# Patient Record
Sex: FEMALE | Race: WHITE | URBAN - METROPOLITAN AREA
[De-identification: names, ages, dates, MRNs, and addresses within clinical notes are randomized per-mention and may not be internally consistent; named-entity substitution may affect disease eponyms.]

---

## 2021-08-18 ENCOUNTER — LAB OUTSIDE AN ENCOUNTER (OUTPATIENT)
Dept: URBAN - METROPOLITAN AREA CLINIC 98 | Facility: CLINIC | Age: 71
End: 2021-08-18

## 2021-08-18 ENCOUNTER — WEB ENCOUNTER (OUTPATIENT)
Dept: URBAN - METROPOLITAN AREA CLINIC 98 | Facility: CLINIC | Age: 71
End: 2021-08-18

## 2021-08-18 ENCOUNTER — OFFICE VISIT (OUTPATIENT)
Dept: URBAN - METROPOLITAN AREA CLINIC 98 | Facility: CLINIC | Age: 71
End: 2021-08-18
Payer: MEDICARE

## 2021-08-18 VITALS
DIASTOLIC BLOOD PRESSURE: 85 MMHG | HEIGHT: 66 IN | WEIGHT: 169 LBS | TEMPERATURE: 97.3 F | SYSTOLIC BLOOD PRESSURE: 143 MMHG | HEART RATE: 60 BPM | BODY MASS INDEX: 27.16 KG/M2

## 2021-08-18 DIAGNOSIS — K63.5 COLON POLYP: ICD-10-CM

## 2021-08-18 DIAGNOSIS — Z91.89 COLON CANCER HIGH RISK: ICD-10-CM

## 2021-08-18 DIAGNOSIS — K21.9 GERD (GASTROESOPHAGEAL REFLUX DISEASE): ICD-10-CM

## 2021-08-18 PROCEDURE — 99214 OFFICE O/P EST MOD 30 MIN: CPT | Performed by: INTERNAL MEDICINE

## 2021-08-18 RX ORDER — SODIUM SULFATE, MAGNESIUM SULFATE, AND POTASSIUM CHLORIDE 17.75; 2.7; 2.25 G/1; G/1; G/1
12 TABLETS TABLET ORAL
Qty: 24 TABLET | Refills: 0 | OUTPATIENT
Start: 2021-08-18 | End: 2021-08-19

## 2021-08-18 NOTE — HPI-TODAY'S VISIT:
Mrs Cervantes is here for f/u: of diarrhea, gerd, and poyps on colonoscopy  Previously on 10/21/2019, she reports that has diarrhea for past 7-8 weeks.  She also notices that she has gassy and gurgling sensation in her mid-abdomen. She has up to 4 BM per day, completely liquid, but now has improved to 1 per day.  This has happened previously, and then resolved.  No recent antibiotics.  No Nsaid use.  The pain in her mid-abdomen is dull and full, burning in sensation.  Not tried any H2 or PPI.  Has been present for about 1 week.  Worse if hungry.   Does not drink much water.  Previously on 1/15/2020 that pt reports since being found to have esophagitis and gastric ulcers.  Since taking omeprazole for 2 months.  Her diarrhea has resolved since taking the PPI. . Today on 8/18/2021, pt reports that she has developed lactose intolerance, but has developed diarrhea since then.  Has started to take lactaid pills, which help somewhat.  No NSAID.  No longer taking PPI. . Has had cologuard in the past.  OSH labs see scan: stool culture neg, CBC nl, cmp nl, celiac panel neg PMH: low vit D SH: Etoh, no smoke FH: No colon cancer   10/31/2019: - LA Grade A reflux esophagitis. - Erythematous duodenopathy.  Biopsied. - Non-bleeding gastric ulcers with no stigmata of bleeding.  Biopsied. - Multiple gastric polyps.  Biopsied.  - One 15 mm polyp in the cecum, removed with a hot snare.  Resected and retrieved. - One 6 mm polyp in the rectum, removed with a hot snare and removed with a cold snare.  Resected and retrieved. - The examination was otherwise normal on direct and retroflexion views.  A. Duodenum , Biopsy: -Duodenal mucosa with no diagnostic abnormality. -Normal villous architecture; no evidence of Celiac sprue. -Alcian blue/PAS stain to identify gastric foveolar metaplasia and Whipple's disease is negative; no parasites are seen. B. Gastric , Biopsy: -Mild chemical/reactive gastropathy. -Alcian blue/PAS stain is negative for intestinal metaplasia. -Giemsa stain is negative for H. pylori organisms, confirmed with immunohistochemical stain. C. Cecum Polyp, Polypectomy: -Hyperplastic polyp. D. Rectum Polyp, Polypectomy: -Tubular adenoma. .

## 2021-09-21 ENCOUNTER — CLAIMS CREATED FROM THE CLAIM WINDOW (OUTPATIENT)
Dept: URBAN - METROPOLITAN AREA CLINIC 4 | Facility: CLINIC | Age: 71
End: 2021-09-21
Payer: MEDICARE

## 2021-09-21 ENCOUNTER — OFFICE VISIT (OUTPATIENT)
Dept: URBAN - METROPOLITAN AREA SURGERY CENTER 18 | Facility: SURGERY CENTER | Age: 71
End: 2021-09-21
Payer: MEDICARE

## 2021-09-21 DIAGNOSIS — K31.89 ACQUIRED DEFORMITY OF DUODENUM: ICD-10-CM

## 2021-09-21 DIAGNOSIS — K25.9 ANTRAL ULCER: ICD-10-CM

## 2021-09-21 DIAGNOSIS — Z86.010 ADENOMAS PERSONAL HISTORY OF COLONIC POLYPS: ICD-10-CM

## 2021-09-21 DIAGNOSIS — K25.7 CHRONIC GASTRIC ULCER WITHOUT HEMORRHAGE OR PERFORATION: ICD-10-CM

## 2021-09-21 DIAGNOSIS — D12.3 ADENOMA OF TRANSVERSE COLON: ICD-10-CM

## 2021-09-21 DIAGNOSIS — K31.89 NONSURGICAL DUMPING SYNDROME: ICD-10-CM

## 2021-09-21 DIAGNOSIS — K63.89 POLYP, HYPERPLASTIC: ICD-10-CM

## 2021-09-21 PROCEDURE — 45385 COLONOSCOPY W/LESION REMOVAL: CPT | Performed by: INTERNAL MEDICINE

## 2021-09-21 PROCEDURE — G8907 PT DOC NO EVENTS ON DISCHARG: HCPCS | Performed by: INTERNAL MEDICINE

## 2021-09-21 PROCEDURE — 45380 COLONOSCOPY AND BIOPSY: CPT | Performed by: INTERNAL MEDICINE

## 2021-09-21 PROCEDURE — 43239 EGD BIOPSY SINGLE/MULTIPLE: CPT | Performed by: INTERNAL MEDICINE

## 2021-09-21 PROCEDURE — 88312 SPECIAL STAINS GROUP 1: CPT | Performed by: PATHOLOGY

## 2021-09-21 PROCEDURE — 88305 TISSUE EXAM BY PATHOLOGIST: CPT | Performed by: PATHOLOGY

## 2021-09-21 RX ORDER — PANTOPRAZOLE SODIUM 40 MG/1
1 TABLET TABLET, DELAYED RELEASE ORAL BID
Qty: 60 TABLET | Refills: 11 | OUTPATIENT
Start: 2021-09-21

## 2021-09-27 ENCOUNTER — TELEPHONE ENCOUNTER (OUTPATIENT)
Dept: URBAN - METROPOLITAN AREA CLINIC 92 | Facility: CLINIC | Age: 71
End: 2021-09-27

## 2022-07-12 ENCOUNTER — OFFICE VISIT (OUTPATIENT)
Dept: URBAN - METROPOLITAN AREA CLINIC 96 | Facility: CLINIC | Age: 72
End: 2022-07-12
Payer: MEDICARE

## 2022-07-12 ENCOUNTER — WEB ENCOUNTER (OUTPATIENT)
Dept: URBAN - METROPOLITAN AREA CLINIC 96 | Facility: CLINIC | Age: 72
End: 2022-07-12

## 2022-07-12 VITALS
DIASTOLIC BLOOD PRESSURE: 92 MMHG | WEIGHT: 173 LBS | TEMPERATURE: 98.7 F | HEIGHT: 66 IN | BODY MASS INDEX: 27.8 KG/M2 | SYSTOLIC BLOOD PRESSURE: 144 MMHG

## 2022-07-12 DIAGNOSIS — K21.9 GERD (GASTROESOPHAGEAL REFLUX DISEASE): ICD-10-CM

## 2022-07-12 DIAGNOSIS — Z91.89 COLON CANCER HIGH RISK: ICD-10-CM

## 2022-07-12 DIAGNOSIS — K63.5 COLON POLYP: ICD-10-CM

## 2022-07-12 PROCEDURE — 99214 OFFICE O/P EST MOD 30 MIN: CPT | Performed by: INTERNAL MEDICINE

## 2022-07-12 RX ORDER — LOSARTAN POTASSIUM 25 MG/1
1 TABLET TABLET ORAL ONCE A DAY
Status: ACTIVE | COMMUNITY

## 2022-07-12 RX ORDER — PANTOPRAZOLE SODIUM 40 MG/1
1 TABLET TABLET, DELAYED RELEASE ORAL BID
Qty: 60 TABLET | Refills: 11 | COMMUNITY
Start: 2021-09-21

## 2022-07-12 RX ORDER — PANTOPRAZOLE SODIUM 40 MG/1
1 TABLET TABLET, DELAYED RELEASE ORAL EVERY 12 HOURS
Qty: 180 TABLET | Refills: 4 | OUTPATIENT

## 2022-07-12 NOTE — HPI-TODAY'S VISIT:
Mrs Cervantes is here for f/u: of diarrhea, gerd, and poyps on colonoscopy. . Previously on 10/21/2019, she reports that has diarrhea for past 7-8 weeks.  She also notices that she has gassy and gurgling sensation in her mid-abdomen. She has up to 4 BM per day, completely liquid, but now has improved to 1 per day.  This has happened previously, and then resolved.  No recent antibiotics.  No Nsaid use.  The pain in her mid-abdomen is dull and full, burning in sensation.  Not tried any H2 or PPI.  Has been present for about 1 week.  Worse if hungry.   Does not drink much water.  Previously on 1/15/2020 that pt reports since being found to have esophagitis and gastric ulcers.  Since taking omeprazole for 2 months.  Her diarrhea has resolved since taking the PPI. . Previously on 8/18/2021, pt reports that she has developed lactose intolerance, but has developed diarrhea since then.  Has started to take lactaid pills, which help somewhat.  No NSAID.  No longer taking PPI. . Today on 7/12/2022, pt reports that she hurt her back  then got an impacted wisdom tooth and then had elevated BP as well.  Had the surgery performed, took some ABX.  Her PCP weaned off of protonix and transitioned to Pepcid; had severe flare and restarted the protonix.  Post-nasal drip improved.  Taking melatonin for sleep now, which is better. . Has had cologuard in the past . OSH labs see scan: stool culture neg, CBC nl, cmp nl, celiac panel neg PMH: low vit D SH: Etoh, no smoke FH: No colon cancer . 10/31/2019: - LA Grade A reflux esophagitis. - Erythematous duodenopathy.  Biopsied. - Non-bleeding gastric ulcers with no stigmata of bleeding.  Biopsied. - Multiple gastric polyps.  Biopsied.  - One 15 mm polyp in the cecum, removed with a hot snare.  Resected and retrieved. - One 6 mm polyp in the rectum, removed with a hot snare and removed with a cold snare.  Resected and retrieved. - The examination was otherwise normal on direct and retroflexion views.  A. Duodenum , Biopsy: -Duodenal mucosa with no diagnostic abnormality. -Normal villous architecture; no evidence of Celiac sprue. -Alcian blue/PAS stain to identify gastric foveolar metaplasia and Whipple's disease is negative; no parasites are seen. B. Gastric , Biopsy: -Mild chemical/reactive gastropathy. -Alcian blue/PAS stain is negative for intestinal metaplasia. -Giemsa stain is negative for H. pylori organisms, confirmed with immunohistochemical stain. C. Cecum Polyp, Polypectomy: -Hyperplastic polyp. D. Rectum Polyp, Polypectomy: -Tubular adenoma. . 9/2021: - Two 2 to 3 mm polyps in the transverse colon, removed with a jumbo cold forceps. Resected and retrieved. - One 9 mm polyp in the transverse colon, removed with a cold snare. Resected and retrieved. - The examination was otherwise normal on direct and retroflexion views. Impression: - Await pathology results. - Repeat colonoscopy in 3 years for surveillance. . Two sessile polyps were found in the transverse colon. The polyps were 2 to 3 mm in size. These polyps were removed with a jumbo cold forceps. Resection and retrieval were complete. The pathology specimen was placed into Bottle Number 2. Findings: : Multiple Polyps A 9 mm polyp was found in the transverse colon. The polyp was sessile. The polyp was removed with a cold snare. Resection and retrieval were complete. The pathology specimen was placed into Bottle Number 2.  The exam was otherwise without abnormality on direct and retroflexion views  . The exam was otherwise without abnormality on direct and retroflexion views  The examined duodenum was normal.  Many non-bleeding cratered and superficial gastric ulcers with no stigmata of bleeding were found in the gastric antrum. The largest lesion was 1 mm in largest dimension. Biopsies were taken with a cold forceps for histology. The pathology specimen was placed into Bottle Number 1. Diffuse mildly erythematous mucosa without bleeding was found in the entire examined stomach. Biopsies were taken with a cold forceps for histology. The pathology specimen was placed into Bottle Number 1. Esophagitis has healed, however, new gastric ulcers are seen. . (A) Stomach, Biopsy: FOVEOLAR HYPERPLASIA, CONSISTENT WITH ULCER BORDER. See Comment. No Evidence of H. Pylori Organisms, Virocytes or Intestinal Metaplasia. Negative For Dysplasia or Malignancy. COMMENT: Foveolar hyperplasia is common in gastric mucosa adjacent to ulcer or as part of chemical gastropathy (such as NSAID, Alcohol and Bile reflux), and rare in the gastric mucosa overlying a mass lesion. (B) Colon, Transverse, Polypectomy: SERRATED POLYP(S), BEST CLASSIFIED AS SESSILE SERRATED ADENOMA

## 2022-09-13 ENCOUNTER — TELEPHONE ENCOUNTER (OUTPATIENT)
Dept: URBAN - METROPOLITAN AREA CLINIC 92 | Facility: CLINIC | Age: 72
End: 2022-09-13

## 2022-09-21 ENCOUNTER — TELEPHONE ENCOUNTER (OUTPATIENT)
Dept: URBAN - METROPOLITAN AREA CLINIC 98 | Facility: CLINIC | Age: 72
End: 2022-09-21

## 2022-09-26 ENCOUNTER — OFFICE VISIT (OUTPATIENT)
Dept: URBAN - METROPOLITAN AREA TELEHEALTH 2 | Facility: TELEHEALTH | Age: 72
End: 2022-09-26
Payer: MEDICARE

## 2022-09-26 ENCOUNTER — DASHBOARD ENCOUNTERS (OUTPATIENT)
Age: 72
End: 2022-09-26

## 2022-09-26 ENCOUNTER — TELEPHONE ENCOUNTER (OUTPATIENT)
Dept: URBAN - METROPOLITAN AREA CLINIC 92 | Facility: CLINIC | Age: 72
End: 2022-09-26

## 2022-09-26 DIAGNOSIS — K21.9 GERD (GASTROESOPHAGEAL REFLUX DISEASE): ICD-10-CM

## 2022-09-26 DIAGNOSIS — Z91.89 COLON CANCER HIGH RISK: ICD-10-CM

## 2022-09-26 DIAGNOSIS — K63.5 COLON POLYP: ICD-10-CM

## 2022-09-26 DIAGNOSIS — R19.7 DIARRHEA, UNSPECIFIED: ICD-10-CM

## 2022-09-26 PROBLEM — 59621000: Status: ACTIVE | Noted: 2022-09-26

## 2022-09-26 PROBLEM — 235595009 GASTROESOPHAGEAL REFLUX DISEASE: Status: ACTIVE | Noted: 2021-08-18

## 2022-09-26 PROBLEM — 62315008: Status: ACTIVE | Noted: 2022-09-26

## 2022-09-26 PROCEDURE — 99215 OFFICE O/P EST HI 40 MIN: CPT | Performed by: INTERNAL MEDICINE

## 2022-09-26 RX ORDER — PANTOPRAZOLE SODIUM 40 MG/1
1 TABLET TABLET, DELAYED RELEASE ORAL BID
Qty: 60 TABLET | Refills: 11 | COMMUNITY
Start: 2021-09-21

## 2022-09-26 RX ORDER — LOSARTAN POTASSIUM 25 MG/1
1 TABLET TABLET ORAL ONCE A DAY
Status: ACTIVE | COMMUNITY

## 2022-09-26 RX ORDER — PANTOPRAZOLE SODIUM 40 MG/1
1 TABLET TABLET, DELAYED RELEASE ORAL EVERY 12 HOURS
Qty: 180 TABLET | Refills: 4 | OUTPATIENT

## 2022-09-26 RX ORDER — PANTOPRAZOLE SODIUM 40 MG/1
1 TABLET TABLET, DELAYED RELEASE ORAL EVERY 12 HOURS
Qty: 180 TABLET | Refills: 4 | Status: ACTIVE | COMMUNITY

## 2022-09-26 NOTE — PHYSICAL EXAM MUSCULOSKELETAL:
----- Message from Oralia Coffey MD sent at 2/7/2019  3:11 PM CST -----  NORMAL MAMMOGRAM AND US   normal gait and station , no tenderness or deformities present

## 2022-09-26 NOTE — HPI-TODAY'S VISIT:
Mrs Cervantes is here for f/u: of diarrhea, gerd, and poyps on colonoscopy. . Previously on 10/21/2019, she reports that has diarrhea for past 7-8 weeks.  She also notices that she has gassy and gurgling sensation in her mid-abdomen. She has up to 4 BM per day, completely liquid, but now has improved to 1 per day.  This has happened previously, and then resolved.  No recent antibiotics.  No Nsaid use.  The pain in her mid-abdomen is dull and full, burning in sensation.  Not tried any H2 or PPI.  Has been present for about 1 week.  Worse if hungry.   Does not drink much water.  Previously on 1/15/2020 that pt reports since being found to have esophagitis and gastric ulcers.  Since taking omeprazole for 2 months.  Her diarrhea has resolved since taking the PPI. . Previously on 8/18/2021, pt reports that she has developed lactose intolerance, but has developed diarrhea since then.  Has started to take lactaid pills, which help somewhat.  No NSAID.  No longer taking PPI. . Previously on 7/12/2022, pt reports that she hurt her back  then got an impacted wisdom tooth and then had elevated BP as well.  Had the surgery performed, took some ABX.  Her PCP weaned off of protonix and transitioned to Pepcid; had severe flare and restarted the protonix.  Post-nasal drip improved.  Taking melatonin for sleep now, which is better. . Today on 9/26/2022, pt reports that her diarrhea has persisted, and happens all day, worse after she eats anything; awakening at night.  Taking her PPI BID.  She has also been taking losartan.  [place] recent travel [place]. . Has had cologuard in the past . OSH labs see scan: stool culture neg, CBC nl, cmp nl, celiac panel neg PMH: low vit D SH: Etoh, no smoke FH: No colon cancer . 10/31/2019: - LA Grade A reflux esophagitis. - Erythematous duodenopathy.  Biopsied. - Non-bleeding gastric ulcers with no stigmata of bleeding.  Biopsied. - Multiple gastric polyps.  Biopsied.  - One 15 mm polyp in the cecum, removed with a hot snare.  Resected and retrieved. - One 6 mm polyp in the rectum, removed with a hot snare and removed with a cold snare.  Resected and retrieved. - The examination was otherwise normal on direct and retroflexion views.  A. Duodenum , Biopsy: -Duodenal mucosa with no diagnostic abnormality. -Normal villous architecture; no evidence of Celiac sprue. -Alcian blue/PAS stain to identify gastric foveolar metaplasia and Whipple's disease is negative; no parasites are seen. B. Gastric , Biopsy: -Mild chemical/reactive gastropathy. -Alcian blue/PAS stain is negative for intestinal metaplasia. -Giemsa stain is negative for H. pylori organisms, confirmed with immunohistochemical stain. C. Cecum Polyp, Polypectomy: -Hyperplastic polyp. D. Rectum Polyp, Polypectomy: -Tubular adenoma. . 9/2021: - Two 2 to 3 mm polyps in the transverse colon, removed with a jumbo cold forceps. Resected and retrieved. - One 9 mm polyp in the transverse colon, removed with a cold snare. Resected and retrieved. - The examination was otherwise normal on direct and retroflexion views. Impression: - Await pathology results. - Repeat colonoscopy in 3 years for surveillance. . Two sessile polyps were found in the transverse colon. The polyps were 2 to 3 mm in size. These polyps were removed with a jumbo cold forceps. Resection and retrieval were complete. The pathology specimen was placed into Bottle Number 2. Findings: : Multiple Polyps A 9 mm polyp was found in the transverse colon. The polyp was sessile. The polyp was removed with a cold snare. Resection and retrieval were complete. The pathology specimen was placed into Bottle Number 2.  The exam was otherwise without abnormality on direct and retroflexion views  . The exam was otherwise without abnormality on direct and retroflexion views  The examined duodenum was normal.  Many non-bleeding cratered and superficial gastric ulcers with no stigmata of bleeding were found in the gastric antrum. The largest lesion was 1 mm in largest dimension. Biopsies were taken with a cold forceps for histology. The pathology specimen was placed into Bottle Number 1. Diffuse mildly erythematous mucosa without bleeding was found in the entire examined stomach. Biopsies were taken with a cold forceps for histology. The pathology specimen was placed into Bottle Number 1. Esophagitis has healed, however, new gastric ulcers are seen. . (A) Stomach, Biopsy: FOVEOLAR HYPERPLASIA, CONSISTENT WITH ULCER BORDER. See Comment. No Evidence of H. Pylori Organisms, Virocytes or Intestinal Metaplasia. Negative For Dysplasia or Malignancy. COMMENT: Foveolar hyperplasia is common in gastric mucosa adjacent to ulcer or as part of chemical gastropathy (such as NSAID, Alcohol and Bile reflux), and rare in the gastric mucosa overlying a mass lesion. (B) Colon, Transverse, Polypectomy: SERRATED POLYP(S), BEST CLASSIFIED AS SESSILE SERRATED ADENOMA

## 2022-10-06 LAB
C DIFFICILE TOXIN GENE NAA: NEGATIVE
CALPROTECTIN, FECAL: <16
CAMPYLOBACTER CULTURE: (no result)
E COLI SHIGA TOXIN EIA: NEGATIVE
GIARDIA LAMBLIA AG, EIA: NEGATIVE
Lab: (no result)
Lab: (no result)
SALMONELLA/SHIGELLA SCREEN: (no result)

## 2022-10-13 ENCOUNTER — TELEPHONE ENCOUNTER (OUTPATIENT)
Dept: URBAN - METROPOLITAN AREA CLINIC 92 | Facility: CLINIC | Age: 72
End: 2022-10-13

## 2023-03-07 ENCOUNTER — TELEPHONE ENCOUNTER (OUTPATIENT)
Dept: URBAN - METROPOLITAN AREA CLINIC 96 | Facility: CLINIC | Age: 73
End: 2023-03-07